# Patient Record
Sex: MALE | Race: WHITE | NOT HISPANIC OR LATINO | Employment: FULL TIME | URBAN - NONMETROPOLITAN AREA
[De-identification: names, ages, dates, MRNs, and addresses within clinical notes are randomized per-mention and may not be internally consistent; named-entity substitution may affect disease eponyms.]

---

## 2023-02-24 PROBLEM — F40.10 SOCIAL ANXIETY DISORDER: Status: ACTIVE | Noted: 2023-02-24

## 2023-02-24 PROBLEM — D69.6 THROMBOCYTOPENIA (CMS-HCC): Status: ACTIVE | Noted: 2023-02-24

## 2023-02-24 PROBLEM — R03.0 ELEVATED BLOOD PRESSURE READING WITHOUT DIAGNOSIS OF HYPERTENSION: Status: ACTIVE | Noted: 2023-02-24

## 2023-02-24 PROBLEM — M25.512 LEFT SHOULDER PAIN: Status: ACTIVE | Noted: 2023-02-24

## 2023-02-24 PROBLEM — F32.A DEPRESSION: Status: ACTIVE | Noted: 2023-02-24

## 2023-02-24 PROBLEM — F41.9 ANXIETY: Status: ACTIVE | Noted: 2023-02-24

## 2023-02-24 PROBLEM — F98.8 ADD (ATTENTION DEFICIT DISORDER): Status: ACTIVE | Noted: 2023-02-24

## 2023-02-24 PROBLEM — F41.0 PANIC ATTACK: Status: ACTIVE | Noted: 2023-02-24

## 2023-02-24 PROBLEM — Z86.19 HISTORY OF HERPES ZOSTER: Status: ACTIVE | Noted: 2023-02-24

## 2023-02-24 RX ORDER — CITALOPRAM 20 MG/1
20 TABLET, FILM COATED ORAL DAILY
COMMUNITY
End: 2023-08-28 | Stop reason: SDUPTHER

## 2023-02-24 RX ORDER — PROPRANOLOL HYDROCHLORIDE 40 MG/1
1 TABLET ORAL 2 TIMES DAILY PRN
COMMUNITY
Start: 2015-04-28 | End: 2023-08-30 | Stop reason: WASHOUT

## 2023-02-24 RX ORDER — HYDROXYZINE HYDROCHLORIDE 25 MG/1
1-2 TABLET, FILM COATED ORAL EVERY 6 HOURS PRN
COMMUNITY
End: 2023-08-29 | Stop reason: SDUPTHER

## 2023-02-24 RX ORDER — BUPROPION HYDROCHLORIDE 150 MG/1
150 TABLET ORAL EVERY MORNING
COMMUNITY
End: 2023-08-30 | Stop reason: WASHOUT

## 2023-03-23 ENCOUNTER — APPOINTMENT (OUTPATIENT)
Dept: PRIMARY CARE | Facility: CLINIC | Age: 33
End: 2023-03-23

## 2023-07-18 ENCOUNTER — TELEPHONE (OUTPATIENT)
Dept: PRIMARY CARE | Facility: CLINIC | Age: 33
End: 2023-07-18

## 2023-07-18 NOTE — TELEPHONE ENCOUNTER
----- Message from Sarah Beth Tirado DO sent at 7/12/2023 12:15 PM EDT -----  Regarding: FW: Testing inquiry  Contact: [phone]  SVN to Look up infertility    ----- Message -----  From: Franciose Bai CMA  Sent: 7/12/2023  10:08 AM EDT  To: Sarah Beth Tirado DO  Subject: FW: Testing inquiry                                ----- Message -----  From: Corwin Sesay  Sent: 7/12/2023  10:03 AM EDT  To: [facility] Support Staff  Subject: Testing inquiry                                  Hello! I have a question I hope you could give me some advice on. My wife and I have been trying to conceive for 6 months. With our first daughter, we had no problem and conceived without an issue. I was wondering how I would go about getting my sperm tested to see if there’s anything I can do to improve our chances.

## 2023-08-28 DIAGNOSIS — F32.A DEPRESSION, UNSPECIFIED DEPRESSION TYPE: ICD-10-CM

## 2023-08-28 DIAGNOSIS — F41.9 ANXIETY: ICD-10-CM

## 2023-08-29 RX ORDER — CITALOPRAM 20 MG/1
20 TABLET, FILM COATED ORAL DAILY
Qty: 90 TABLET | Refills: 3 | Status: SHIPPED | OUTPATIENT
Start: 2023-08-29 | End: 2024-08-28

## 2023-08-29 NOTE — PROGRESS NOTES
"Subjective   Patient ID: Corwin Sesay is a 32 y.o. male who presents for Follow-up (Pt presents for follow up medications- pt states no concerns at this time.).    HPI     Patient is self-pay.    Patient presents today for follow-up mood and ADD.    CHRONIC CONDITIONS:  -Mood  Notes felt depressed after tapering off the Adderall he was on for ADD  Since resumed mood medications and trying non-stimulants per 2/2023 OV.    Current regimen:  Celexa 20 mg daily, per prior note would consider Pristiq if not effective/tolerable    Prior medications:  Propranolol 40 mg PRN, just stopped.  BuSpar, caused insomnia   Lexapro, unable to tolerate due to decreased libido.  Wellbutrin, prescribed 2/2023, not taking per 8/2023 visit.    States he continues with Celexa at this time.  Feels his mood is doing well overall.  Does think there is some sedation, has a hard time waking up.  Taking meds at 5 pm, was taking in the AM but felt tired all day after.  No other side effects reported.  States he thinks he needs to continue on medication, will sometimes get \"alyssa vu\" type episodes of flash backs to when he felt depressed.     -ADD  Wellbutrin 150 mg daily, started 2/2023, no longer taking 8/2023  Was on Adderall prior to this, did not want to be on this anymore so stopped it and had changes in mood.      Review of Systems   All other systems reviewed and are negative.      Objective   /66 (BP Location: Left arm, Patient Position: Sitting, BP Cuff Size: Small adult)   Pulse 79   Temp 36.8 °C (98.2 °F) (Temporal)   Resp 16   Wt 85.1 kg (187 lb 9.6 oz)   SpO2 98%   BMI 24.75 kg/m²     Physical Exam  Vitals and nursing note reviewed.   Constitutional:       General: He is not in acute distress.     Appearance: Normal appearance. He is not toxic-appearing.   HENT:      Head: Normocephalic and atraumatic.   Eyes:      Extraocular Movements: Extraocular movements intact.      Pupils: Pupils are equal, round, and reactive to " "light.   Cardiovascular:      Rate and Rhythm: Normal rate and regular rhythm.      Heart sounds: No murmur heard.     No friction rub. No gallop.   Pulmonary:      Effort: Pulmonary effort is normal.      Breath sounds: Normal breath sounds. No wheezing, rhonchi or rales.   Neurological:      General: No focal deficit present.      Mental Status: He is alert and oriented to person, place, and time.   Psychiatric:         Mood and Affect: Mood normal.         Behavior: Behavior normal.         Assessment/Plan   Problem List Items Addressed This Visit       ADD (attention deficit disorder)     Doing well off medications for ADD, plans to continue to manage on his own at this time.         Depression - Primary     Reports good control on current regimen, will continue with Celexa 20 mg daily.  Patient to reach out if reassessment needed.  Non-prescription measures reviewed with patient.  Patient advised to monitor his \"alyssa vu\" type sensations, he should reach out if these persist or become concerning.            Follow-up in 1 year for routine care.  Call for sooner follow-up if needed.     Scribe Attestation  By signing my name below, IBrandy, Rodriguez   attest that this documentation has been prepared under the direction and in the presence of Sarah Beth Tirado DO.      "

## 2023-08-30 ENCOUNTER — OFFICE VISIT (OUTPATIENT)
Dept: PRIMARY CARE | Facility: CLINIC | Age: 33
End: 2023-08-30

## 2023-08-30 VITALS
SYSTOLIC BLOOD PRESSURE: 116 MMHG | DIASTOLIC BLOOD PRESSURE: 66 MMHG | BODY MASS INDEX: 24.75 KG/M2 | OXYGEN SATURATION: 98 % | HEART RATE: 79 BPM | WEIGHT: 187.6 LBS | RESPIRATION RATE: 16 BRPM | TEMPERATURE: 98.2 F

## 2023-08-30 DIAGNOSIS — F32.A DEPRESSION, UNSPECIFIED DEPRESSION TYPE: Primary | ICD-10-CM

## 2023-08-30 DIAGNOSIS — F98.8 ATTENTION DEFICIT DISORDER, UNSPECIFIED HYPERACTIVITY PRESENCE: ICD-10-CM

## 2023-08-30 PROCEDURE — 99213 OFFICE O/P EST LOW 20 MIN: CPT | Performed by: FAMILY MEDICINE

## 2023-08-30 PROCEDURE — 1036F TOBACCO NON-USER: CPT | Performed by: FAMILY MEDICINE

## 2023-08-30 ASSESSMENT — PATIENT HEALTH QUESTIONNAIRE - PHQ9
1. LITTLE INTEREST OR PLEASURE IN DOING THINGS: NOT AT ALL
7. TROUBLE CONCENTRATING ON THINGS, SUCH AS READING THE NEWSPAPER OR WATCHING TELEVISION: NOT AT ALL
8. MOVING OR SPEAKING SO SLOWLY THAT OTHER PEOPLE COULD HAVE NOTICED. OR THE OPPOSITE, BEING SO FIGETY OR RESTLESS THAT YOU HAVE BEEN MOVING AROUND A LOT MORE THAN USUAL: NOT AT ALL
SUM OF ALL RESPONSES TO PHQ QUESTIONS 1-9: 4
2. FEELING DOWN, DEPRESSED OR HOPELESS: NOT AT ALL
SUM OF ALL RESPONSES TO PHQ9 QUESTIONS 1 AND 2: 0
4. FEELING TIRED OR HAVING LITTLE ENERGY: SEVERAL DAYS
6. FEELING BAD ABOUT YOURSELF - OR THAT YOU ARE A FAILURE OR HAVE LET YOURSELF OR YOUR FAMILY DOWN: NOT AT ALL
9. THOUGHTS THAT YOU WOULD BE BETTER OFF DEAD, OR OF HURTING YOURSELF: NOT AT ALL
10. IF YOU CHECKED OFF ANY PROBLEMS, HOW DIFFICULT HAVE THESE PROBLEMS MADE IT FOR YOU TO DO YOUR WORK, TAKE CARE OF THINGS AT HOME, OR GET ALONG WITH OTHER PEOPLE: SOMEWHAT DIFFICULT
5. POOR APPETITE OR OVEREATING: SEVERAL DAYS
3. TROUBLE FALLING OR STAYING ASLEEP OR SLEEPING TOO MUCH: MORE THAN HALF THE DAYS

## 2023-08-30 ASSESSMENT — ANXIETY QUESTIONNAIRES
1. FEELING NERVOUS, ANXIOUS, OR ON EDGE: SEVERAL DAYS
7. FEELING AFRAID AS IF SOMETHING AWFUL MIGHT HAPPEN: SEVERAL DAYS
3. WORRYING TOO MUCH ABOUT DIFFERENT THINGS: NOT AT ALL
IF YOU CHECKED OFF ANY PROBLEMS ON THIS QUESTIONNAIRE, HOW DIFFICULT HAVE THESE PROBLEMS MADE IT FOR YOU TO DO YOUR WORK, TAKE CARE OF THINGS AT HOME, OR GET ALONG WITH OTHER PEOPLE: SOMEWHAT DIFFICULT
6. BECOMING EASILY ANNOYED OR IRRITABLE: NOT AT ALL
5. BEING SO RESTLESS THAT IT IS HARD TO SIT STILL: NOT AT ALL
4. TROUBLE RELAXING: SEVERAL DAYS
2. NOT BEING ABLE TO STOP OR CONTROL WORRYING: SEVERAL DAYS
GAD7 TOTAL SCORE: 4

## 2023-08-30 NOTE — ASSESSMENT & PLAN NOTE
">>ASSESSMENT AND PLAN FOR DEPRESSION WRITTEN ON 8/30/2023 11:34 AM BY ASHELY DE LA FUENTE    Reports good control on current regimen, will continue with Celexa 20 mg daily.  Patient to reach out if reassessment needed.  Non-prescription measures reviewed with patient.  Patient advised to monitor his \"alyssa vu\" type sensations, he should reach out if these persist or become concerning.  "

## 2023-08-30 NOTE — ASSESSMENT & PLAN NOTE
"Reports good control on current regimen, will continue with Celexa 20 mg daily.  Patient to reach out if reassessment needed.  Non-prescription measures reviewed with patient.  Patient advised to monitor his \"alyssa vu\" type sensations, he should reach out if these persist or become concerning.  "

## 2023-10-15 DIAGNOSIS — Z31.69 INFERTILITY COUNSELING: Primary | ICD-10-CM

## 2023-11-27 ENCOUNTER — APPOINTMENT (OUTPATIENT)
Dept: UROLOGY | Facility: HOSPITAL | Age: 33
End: 2023-11-27

## 2024-05-24 ENCOUNTER — OFFICE VISIT (OUTPATIENT)
Dept: PRIMARY CARE | Facility: CLINIC | Age: 34
End: 2024-05-24

## 2024-05-24 VITALS
BODY MASS INDEX: 24 KG/M2 | WEIGHT: 181.9 LBS | TEMPERATURE: 98.1 F | DIASTOLIC BLOOD PRESSURE: 74 MMHG | SYSTOLIC BLOOD PRESSURE: 123 MMHG | OXYGEN SATURATION: 97 % | HEART RATE: 92 BPM

## 2024-05-24 DIAGNOSIS — F98.8 ATTENTION DEFICIT DISORDER, UNSPECIFIED HYPERACTIVITY PRESENCE: Primary | ICD-10-CM

## 2024-05-24 DIAGNOSIS — H60.502 ACUTE OTITIS EXTERNA OF LEFT EAR, UNSPECIFIED TYPE: ICD-10-CM

## 2024-05-24 PROCEDURE — 99213 OFFICE O/P EST LOW 20 MIN: CPT | Performed by: FAMILY MEDICINE

## 2024-05-24 RX ORDER — DEXTROAMPHETAMINE SACCHARATE, AMPHETAMINE ASPARTATE MONOHYDRATE, DEXTROAMPHETAMINE SULFATE AND AMPHETAMINE SULFATE 7.5; 7.5; 7.5; 7.5 MG/1; MG/1; MG/1; MG/1
30 CAPSULE, EXTENDED RELEASE ORAL EVERY MORNING
Qty: 30 CAPSULE | Refills: 0 | Status: SHIPPED | OUTPATIENT
Start: 2024-05-24 | End: 2024-06-23

## 2024-05-24 RX ORDER — NEOMYCIN SULFATE, POLYMYXIN B SULFATE AND HYDROCORTISONE 10; 3.5; 1 MG/ML; MG/ML; [USP'U]/ML
3 SUSPENSION/ DROPS AURICULAR (OTIC) 4 TIMES DAILY
Qty: 10 ML | Refills: 0 | Status: SHIPPED | OUTPATIENT
Start: 2024-05-24 | End: 2024-05-31

## 2024-05-24 NOTE — PROGRESS NOTES
Subjective   Patient ID: Corwin Sesay is a 33 y.o. male who presents for Med Management (Pt wants to be put back on ADHD medication ) and Earache (Symptoms: drainage, pain, fullness started having pain yesterday morning and has become worse ).  HPI  ADD  - Adderall  IR 20 mg TID ,  for cost reasons   Would like to be on extended release   for ease. 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  Working long hours .  Has been on meds of various time release and at one point was on XR in day,  IR afternoon.   Does not feel that is needed with current lifestyle/ work     Depression and Anxiety - several meds tried .  Celexa seems to be the one he tolerates the best . Plans to continue     Ear pain, with drng.    Sxs as in nurse note above.     Review of Systems    Objective   /74 (BP Location: Right arm, Patient Position: Sitting, BP Cuff Size: Adult)   Pulse 92   Temp 36.7 °C (98.1 °F) (Temporal)   Wt 82.5 kg (181 lb 14.4 oz)   SpO2 97%   BMI 24.00 kg/m²     Physical Exam  Vitals and nursing note reviewed.   Cardiovascular:      Heart sounds: Normal heart sounds.   Pulmonary:      Breath sounds: Normal breath sounds.   Neurological:      General: No focal deficit present.      Mental Status: He is oriented to person, place, and time. Mental status is at baseline.   Psychiatric:         Mood and Affect: Mood normal.         Behavior: Behavior normal.         Thought Content: Thought content normal.         Judgment: Judgment normal.           EAR EXAM :  swelling of canal,  w mild erythema, and white discharge    Assessment/Plan   Problem List Items Addressed This Visit          Medium    ADD (attention deficit disorder) - Primary    Relevant Medications    amphetamine-dextroamphetamine XR (Adderall XR) 30 mg 24 hr capsule    Other Relevant Orders    DRUG SCREEN,URINE     Other Visit Diagnoses       Acute otitis externa of left ear, unspecified type         Relevant Medications    neomycin-polymyxin-HC (Cortisporin) 3.5-10,000-1 mg/mL-unit/mL-% otic suspension          ADD   - restart medication   - CSA completed today ,  UDS ordered today   - reviewed sxs,  outcome desired , will monitor while on todays prescribed dose and let us know if not achieving outcome or having side eff.    Acute OE   -tx prescribed,   Notify/ followup if not resolving    NEXT Visit  : CARINA Quiroz MD

## 2024-06-02 ENCOUNTER — PATIENT MESSAGE (OUTPATIENT)
Dept: PRIMARY CARE | Facility: CLINIC | Age: 34
End: 2024-06-02

## 2024-06-11 ENCOUNTER — LAB (OUTPATIENT)
Dept: LAB | Facility: LAB | Age: 34
End: 2024-06-11

## 2024-06-11 DIAGNOSIS — F98.8 ATTENTION DEFICIT DISORDER, UNSPECIFIED HYPERACTIVITY PRESENCE: ICD-10-CM

## 2024-06-11 PROCEDURE — 80307 DRUG TEST PRSMV CHEM ANLYZR: CPT

## 2024-06-12 LAB
AMPHETAMINES UR QL SCN: ABNORMAL
BARBITURATES UR QL SCN: ABNORMAL
BENZODIAZ UR QL SCN: ABNORMAL
BZE UR QL SCN: ABNORMAL
CANNABINOIDS UR QL SCN: ABNORMAL
FENTANYL+NORFENTANYL UR QL SCN: ABNORMAL
METHADONE UR QL SCN: ABNORMAL
OPIATES UR QL SCN: ABNORMAL
OXYCODONE+OXYMORPHONE UR QL SCN: ABNORMAL
PCP UR QL SCN: ABNORMAL

## 2024-06-21 DIAGNOSIS — F98.8 ATTENTION DEFICIT DISORDER, UNSPECIFIED HYPERACTIVITY PRESENCE: ICD-10-CM

## 2024-06-21 RX ORDER — DEXTROAMPHETAMINE SACCHARATE, AMPHETAMINE ASPARTATE MONOHYDRATE, DEXTROAMPHETAMINE SULFATE AND AMPHETAMINE SULFATE 7.5; 7.5; 7.5; 7.5 MG/1; MG/1; MG/1; MG/1
30 CAPSULE, EXTENDED RELEASE ORAL EVERY MORNING
Qty: 30 CAPSULE | Refills: 0 | Status: SHIPPED | OUTPATIENT
Start: 2024-06-21 | End: 2024-07-21

## 2024-06-24 DIAGNOSIS — F98.8 ATTENTION DEFICIT DISORDER, UNSPECIFIED HYPERACTIVITY PRESENCE: ICD-10-CM

## 2024-06-24 RX ORDER — DEXTROAMPHETAMINE SACCHARATE, AMPHETAMINE ASPARTATE MONOHYDRATE, DEXTROAMPHETAMINE SULFATE AND AMPHETAMINE SULFATE 7.5; 7.5; 7.5; 7.5 MG/1; MG/1; MG/1; MG/1
30 CAPSULE, EXTENDED RELEASE ORAL EVERY MORNING
Qty: 30 CAPSULE | Refills: 0 | Status: SHIPPED | OUTPATIENT
Start: 2024-06-24 | End: 2024-07-24

## 2024-07-22 DIAGNOSIS — F98.8 ATTENTION DEFICIT DISORDER, UNSPECIFIED HYPERACTIVITY PRESENCE: ICD-10-CM

## 2024-07-22 RX ORDER — DEXTROAMPHETAMINE SACCHARATE, AMPHETAMINE ASPARTATE MONOHYDRATE, DEXTROAMPHETAMINE SULFATE AND AMPHETAMINE SULFATE 7.5; 7.5; 7.5; 7.5 MG/1; MG/1; MG/1; MG/1
30 CAPSULE, EXTENDED RELEASE ORAL EVERY MORNING
Qty: 30 CAPSULE | Refills: 0 | Status: SHIPPED | OUTPATIENT
Start: 2024-07-22 | End: 2024-08-21

## 2024-08-16 DIAGNOSIS — F32.A DEPRESSION, UNSPECIFIED DEPRESSION TYPE: ICD-10-CM

## 2024-08-16 DIAGNOSIS — F41.9 ANXIETY: ICD-10-CM

## 2024-08-16 RX ORDER — CITALOPRAM 20 MG/1
20 TABLET, FILM COATED ORAL DAILY
Qty: 90 TABLET | Refills: 3 | Status: SHIPPED | OUTPATIENT
Start: 2024-08-16 | End: 2025-08-16

## 2024-08-21 DIAGNOSIS — F98.8 ATTENTION DEFICIT DISORDER, UNSPECIFIED HYPERACTIVITY PRESENCE: Primary | ICD-10-CM

## 2024-08-21 RX ORDER — DEXTROAMPHETAMINE SACCHARATE, AMPHETAMINE ASPARTATE MONOHYDRATE, DEXTROAMPHETAMINE SULFATE AND AMPHETAMINE SULFATE 7.5; 7.5; 7.5; 7.5 MG/1; MG/1; MG/1; MG/1
30 CAPSULE, EXTENDED RELEASE ORAL EVERY MORNING
Qty: 30 CAPSULE | Refills: 0 | Status: SHIPPED | OUTPATIENT
Start: 2024-09-20 | End: 2024-10-20

## 2024-08-21 RX ORDER — DEXTROAMPHETAMINE SACCHARATE, AMPHETAMINE ASPARTATE MONOHYDRATE, DEXTROAMPHETAMINE SULFATE AND AMPHETAMINE SULFATE 7.5; 7.5; 7.5; 7.5 MG/1; MG/1; MG/1; MG/1
30 CAPSULE, EXTENDED RELEASE ORAL EVERY MORNING
Qty: 30 CAPSULE | Refills: 0 | Status: SHIPPED | OUTPATIENT
Start: 2024-08-21 | End: 2024-09-20

## 2024-08-21 RX ORDER — DEXTROAMPHETAMINE SACCHARATE, AMPHETAMINE ASPARTATE, DEXTROAMPHETAMINE SULFATE AND AMPHETAMINE SULFATE 5; 5; 5; 5 MG/1; MG/1; MG/1; MG/1
20 TABLET ORAL DAILY
Qty: 30 TABLET | Refills: 0 | Status: SHIPPED | OUTPATIENT
Start: 2024-09-20 | End: 2024-10-20

## 2024-08-21 RX ORDER — DEXTROAMPHETAMINE SACCHARATE, AMPHETAMINE ASPARTATE, DEXTROAMPHETAMINE SULFATE AND AMPHETAMINE SULFATE 5; 5; 5; 5 MG/1; MG/1; MG/1; MG/1
20 TABLET ORAL DAILY
Qty: 30 TABLET | Refills: 0 | Status: SHIPPED | OUTPATIENT
Start: 2024-10-20 | End: 2024-11-19

## 2024-08-21 RX ORDER — DEXTROAMPHETAMINE SACCHARATE, AMPHETAMINE ASPARTATE, DEXTROAMPHETAMINE SULFATE AND AMPHETAMINE SULFATE 5; 5; 5; 5 MG/1; MG/1; MG/1; MG/1
20 TABLET ORAL DAILY
Qty: 30 TABLET | Refills: 0 | Status: SHIPPED | OUTPATIENT
Start: 2024-08-21 | End: 2024-09-20

## 2024-08-21 RX ORDER — DEXTROAMPHETAMINE SACCHARATE, AMPHETAMINE ASPARTATE MONOHYDRATE, DEXTROAMPHETAMINE SULFATE AND AMPHETAMINE SULFATE 7.5; 7.5; 7.5; 7.5 MG/1; MG/1; MG/1; MG/1
30 CAPSULE, EXTENDED RELEASE ORAL EVERY MORNING
Qty: 30 CAPSULE | Refills: 0 | Status: SHIPPED | OUTPATIENT
Start: 2024-10-20 | End: 2024-11-19

## 2024-09-03 ENCOUNTER — APPOINTMENT (OUTPATIENT)
Dept: PRIMARY CARE | Facility: CLINIC | Age: 34
End: 2024-09-03

## 2024-09-03 VITALS
HEART RATE: 91 BPM | SYSTOLIC BLOOD PRESSURE: 131 MMHG | OXYGEN SATURATION: 98 % | BODY MASS INDEX: 22.87 KG/M2 | HEIGHT: 74 IN | DIASTOLIC BLOOD PRESSURE: 80 MMHG | WEIGHT: 178.2 LBS | RESPIRATION RATE: 14 BRPM | TEMPERATURE: 96.8 F

## 2024-09-03 DIAGNOSIS — R03.0 ELEVATED BLOOD PRESSURE READING WITHOUT DIAGNOSIS OF HYPERTENSION: ICD-10-CM

## 2024-09-03 DIAGNOSIS — F98.8 ATTENTION DEFICIT DISORDER, UNSPECIFIED HYPERACTIVITY PRESENCE: ICD-10-CM

## 2024-09-03 DIAGNOSIS — F32.A DEPRESSION, UNSPECIFIED DEPRESSION TYPE: Primary | ICD-10-CM

## 2024-09-03 PROBLEM — M25.512 LEFT SHOULDER PAIN: Status: RESOLVED | Noted: 2023-02-24 | Resolved: 2024-09-03

## 2024-09-03 PROBLEM — Z00.00 HEALTHCARE MAINTENANCE: Status: ACTIVE | Noted: 2024-09-03

## 2024-09-03 PROBLEM — D69.6 THROMBOCYTOPENIA (CMS-HCC): Status: RESOLVED | Noted: 2023-02-24 | Resolved: 2024-09-03

## 2024-09-03 PROCEDURE — 1036F TOBACCO NON-USER: CPT | Performed by: FAMILY MEDICINE

## 2024-09-03 PROCEDURE — 99213 OFFICE O/P EST LOW 20 MIN: CPT | Performed by: FAMILY MEDICINE

## 2024-09-03 PROCEDURE — 3008F BODY MASS INDEX DOCD: CPT | Performed by: FAMILY MEDICINE

## 2024-09-03 RX ORDER — DEXTROAMPHETAMINE SACCHARATE, AMPHETAMINE ASPARTATE MONOHYDRATE, DEXTROAMPHETAMINE SULFATE AND AMPHETAMINE SULFATE 7.5; 7.5; 7.5; 7.5 MG/1; MG/1; MG/1; MG/1
30 CAPSULE, EXTENDED RELEASE ORAL EVERY MORNING
Qty: 90 CAPSULE | Refills: 0 | Status: SHIPPED | OUTPATIENT
Start: 2024-09-20 | End: 2024-12-19

## 2024-09-03 RX ORDER — DEXTROAMPHETAMINE SACCHARATE, AMPHETAMINE ASPARTATE, DEXTROAMPHETAMINE SULFATE AND AMPHETAMINE SULFATE 5; 5; 5; 5 MG/1; MG/1; MG/1; MG/1
20 TABLET ORAL DAILY
Qty: 90 TABLET | Refills: 0 | Status: SHIPPED | OUTPATIENT
Start: 2024-09-20 | End: 2024-12-19

## 2024-09-03 ASSESSMENT — PATIENT HEALTH QUESTIONNAIRE - PHQ9
3. TROUBLE FALLING OR STAYING ASLEEP OR SLEEPING TOO MUCH: SEVERAL DAYS
8. MOVING OR SPEAKING SO SLOWLY THAT OTHER PEOPLE COULD HAVE NOTICED. OR THE OPPOSITE, BEING SO FIGETY OR RESTLESS THAT YOU HAVE BEEN MOVING AROUND A LOT MORE THAN USUAL: NOT AT ALL
SUM OF ALL RESPONSES TO PHQ9 QUESTIONS 1 AND 2: 0
10. IF YOU CHECKED OFF ANY PROBLEMS, HOW DIFFICULT HAVE THESE PROBLEMS MADE IT FOR YOU TO DO YOUR WORK, TAKE CARE OF THINGS AT HOME, OR GET ALONG WITH OTHER PEOPLE: SOMEWHAT DIFFICULT
7. TROUBLE CONCENTRATING ON THINGS, SUCH AS READING THE NEWSPAPER OR WATCHING TELEVISION: NOT AT ALL
9. THOUGHTS THAT YOU WOULD BE BETTER OFF DEAD, OR OF HURTING YOURSELF: NOT AT ALL
1. LITTLE INTEREST OR PLEASURE IN DOING THINGS: NOT AT ALL
4. FEELING TIRED OR HAVING LITTLE ENERGY: NOT AT ALL
2. FEELING DOWN, DEPRESSED OR HOPELESS: NOT AT ALL
SUM OF ALL RESPONSES TO PHQ QUESTIONS 1-9: 1
5. POOR APPETITE OR OVEREATING: NOT AT ALL
6. FEELING BAD ABOUT YOURSELF - OR THAT YOU ARE A FAILURE OR HAVE LET YOURSELF OR YOUR FAMILY DOWN: NOT AT ALL

## 2024-09-03 ASSESSMENT — ANXIETY QUESTIONNAIRES
3. WORRYING TOO MUCH ABOUT DIFFERENT THINGS: SEVERAL DAYS
GAD7 TOTAL SCORE: 2
5. BEING SO RESTLESS THAT IT IS HARD TO SIT STILL: NOT AT ALL
2. NOT BEING ABLE TO STOP OR CONTROL WORRYING: SEVERAL DAYS
6. BECOMING EASILY ANNOYED OR IRRITABLE: NOT AT ALL
IF YOU CHECKED OFF ANY PROBLEMS ON THIS QUESTIONNAIRE, HOW DIFFICULT HAVE THESE PROBLEMS MADE IT FOR YOU TO DO YOUR WORK, TAKE CARE OF THINGS AT HOME, OR GET ALONG WITH OTHER PEOPLE: NOT DIFFICULT AT ALL
1. FEELING NERVOUS, ANXIOUS, OR ON EDGE: NOT AT ALL
4. TROUBLE RELAXING: NOT AT ALL
7. FEELING AFRAID AS IF SOMETHING AWFUL MIGHT HAPPEN: NOT AT ALL

## 2024-09-03 NOTE — ASSESSMENT & PLAN NOTE
Well controlled on current regimen.    Continue Adderall XR 30 mg daily and Adderall IR 20 mg PRN.    Prescriptions have been refilled. If symptoms are well controlled, and any side effects of your medicine are tolerable, call in 3 months for another 3 months of refills. We will do a routine check up for ADD in the office in 6 months.       As always, consistent exercise and a healthy diet with limited refined carbohydrates can be helpful for maximizing focus.  This means limiting or eliminating such things as pretzels, pasta, cereals, breakfast bars, breads, pastries, and sugary drinks and  instead eating generous amounts of vegetables, lean proteins, and healthy fats such as those found in olive oil, avocados, fatty fishes such as salmon, and nuts .      Consistent exercise can also help with focus.   150 minutes of exercise per week divided up on multiple days is recommended.     Controlled Substance Visit -- I have personally reviewed the OARRS report for this patient. There is copy of report in electronic medical record. I have considered the risks of abuse, dependence, addiction, and diversion. I believe that it is clinically appropriate for this patient to be prescribed this medication.    CS requirements:  CSA completed 5/24/2024  UDS completed 6/11/2024

## 2024-09-03 NOTE — ASSESSMENT & PLAN NOTE
">>ASSESSMENT AND PLAN FOR DEPRESSION WRITTEN ON 9/3/2024 10:35 AM BY ASHLEY DE LA FUENTE    Scales and scores reviewed and discussed, these indicate clinical remission.  Since under good control on current regimen, will continue with Celexa 20 mg daily.  We discussed option of \"Genesight Testing\" for preferred medications, patient to reach out if wishes to pursue.  Will continue to follow-up on mood every 6 months, can call if sooner evaluation needed.  "

## 2024-09-03 NOTE — PROGRESS NOTES
Subjective   Patient ID: Corwin Sesay is a 33 y.o. male who presents for Flu Vaccine (Pt declines flu vaccine at this time. /) and Follow-up.    HPI     Patient presents today for routine follow-up + CS visit  Initially scheduled for annual physical - this was deferred as patient is self pay  No family hx of colon cancer or prostate cancer  No family hx of cardiac disease    Patient concerns:  Patient is doing well overall, they have no new concerns or issues.     ROUTINE VISIT  CHRONIC CONDITIONS:   Mood  Notes felt depressed after tapering off the Adderall he was on for ADD  Since resumed mood medications and trying non-stimulants per 2/2023 OV.  Resuming stimulants for ADD 5/2024    Current regimen:  Celexa 20 mg daily, per prior note would consider Pristiq if not effective/tolerable    Prior medications:  Propranolol 40 mg PRN, just stopped.  BuSpar, caused insomnia   Lexapro, unable to tolerate due to decreased libido.  Wellbutrin, prescribed 2/2023, not taking per 8/2023 visit.    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   Patient reports increased focus on medication. There is improved concentration, organization, and task completion.   Side effects are denied. No headaches, anorexia, weight loss, palpitations, or stomach upset.  Patient feels that the current dose is appropriate, and wishes to continue on the currently prescribed medication as reflected in chart.      No illcit drug use  No EtOH use, states has not had any in 2 years    States adderall not only helps with ADD symptoms but overall mood.    Review of Systems   All other systems reviewed and are negative.      Objective   /80 (BP Location: Left arm, Patient Position: Sitting, BP Cuff Size: Adult)   Pulse 91   Temp 36 °C (96.8 °F) (Temporal)   Resp 14   Ht 1.88 m (6' 2\")   Wt 80.8 kg (178 lb 3.2 oz)   SpO2 98%   BMI 22.88 kg/m²     Physical Exam  Vitals and nursing note reviewed.   Constitutional:       General: He is not in acute distress.     Appearance: Normal appearance. He is not toxic-appearing.   HENT:      Head: Normocephalic and atraumatic.   Eyes:      Extraocular Movements: Extraocular movements intact.      Pupils: Pupils are equal, round, and reactive to light.   Cardiovascular:      Rate and Rhythm: Normal rate and regular rhythm.      Heart sounds: No murmur heard.     No friction rub. No gallop.   Pulmonary:      Effort: Pulmonary effort is normal.      Breath sounds: Normal breath sounds. No wheezing, rhonchi or rales.   Neurological:      General: No focal deficit present.      Mental Status: He is alert and oriented to person, place, and time.   Psychiatric:         Mood and Affect: Mood normal.         Behavior: Behavior normal.         Assessment/Plan   Problem List Items Addressed This Visit             ICD-10-CM    ADD (attention deficit disorder) F98.8     Well controlled on current regimen.    Continue Adderall XR 30 mg daily and Adderall IR 20 mg PRN.    Prescriptions have been refilled. If symptoms are well " "controlled, and any side effects of your medicine are tolerable, call in 3 months for another 3 months of refills. We will do a routine check up for ADD in the office in 6 months.       As always, consistent exercise and a healthy diet with limited refined carbohydrates can be helpful for maximizing focus.  This means limiting or eliminating such things as pretzels, pasta, cereals, breakfast bars, breads, pastries, and sugary drinks and  instead eating generous amounts of vegetables, lean proteins, and healthy fats such as those found in olive oil, avocados, fatty fishes such as salmon, and nuts .      Consistent exercise can also help with focus.   150 minutes of exercise per week divided up on multiple days is recommended.     Controlled Substance Visit -- I have personally reviewed the OARRS report for this patient. There is copy of report in electronic medical record. I have considered the risks of abuse, dependence, addiction, and diversion. I believe that it is clinically appropriate for this patient to be prescribed this medication.    CS requirements:  CSA completed 5/24/2024  UDS completed 6/11/2024         Depression - Primary F32.A     Scales and scores reviewed and discussed, these indicate clinical remission.  Since under good control on current regimen, will continue with Celexa 20 mg daily.  We discussed option of \"Genesight Testing\" for preferred medications, patient to reach out if wishes to pursue.  Will continue to follow-up on mood every 6 months, can call if sooner evaluation needed.         Elevated blood pressure reading without diagnosis of hypertension R03.0     BP is 131/80 in office today, Goal BP is 130/80 or less, ideally 120/80 or less.   Patient to monitor BP outside the office, if consistently elevated will need to revisit.    Patient is encouraged to continue low sodium diet (Dietary Approach to Stop Hypertension, or DASH diet) .   Exercise most days of the week.   Limit refined " carbohydrates such as pretzels, cereals, breads, pastries, alcohol.    If patient wishes to try a natural approach to lowering blood pressure, can consider:  -whey protein 20 -30 g daily (hydrolyzed is best, but any is ok)  -aged garlic 600 mg twice daily (Kyolic brand aged garlic on line is one example)   -CoQ10 supplement at 120 mg - 360 mg daily (average dose studied 225mg daily).     Patient to check BP regularly at home and keep a diary.  This should be taken after sitting with feet flat on floor and resting for 5 minutes.   Arm should be level with your heart.   New guidelines recommend goal for blood pressure less than 130/80.   Ideally for stroke and heart attack risk reduction the systolic, or top, blood pressure number should be in the 110's or 120's.    PLEASE BRING BP CUFF IN TO NEXT VISIT FOR VALIDATION - TAKE YOUR BP @ HOME BEFORE YOU COME!             Follow-up in 6 months for recheck ADD + CS visit.  Call for sooner follow-up if needed.       Scribe Attestation  By signing my name below, IBrandy Scribe   attest that this documentation has been prepared under the direction and in the presence of Sarah Beth Tirado DO.

## 2024-09-03 NOTE — PATIENT INSTRUCTIONS
ADD - will send in 90 d worth when next due (sept 20)   adderall xr 30 am   adderall ir 20      Anxiety -  tried multiple agents in past,   had severe depression / anhedonia -   continue on citalopram/ celexa despite some sedation     Would consider GeneSite testing in future if we need to change medicine  (looked at infertility from sperm DNA fracturing-   1 meta analysis did show increased incidence with ssri's,     patient unsure if citalopram was related to his finding with infertility or not)     Patient will look up gene site,   can request testing from me (or dr sweeney or saqib in our practice both order that test) if interested in future     Pt is cash pay,  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

## 2024-09-03 NOTE — ASSESSMENT & PLAN NOTE
BP is 131/80 in office today, Goal BP is 130/80 or less, ideally 120/80 or less.   Patient to monitor BP outside the office, if consistently elevated will need to revisit.    Patient is encouraged to continue low sodium diet (Dietary Approach to Stop Hypertension, or DASH diet) .   Exercise most days of the week.   Limit refined carbohydrates such as pretzels, cereals, breads, pastries, alcohol.    If patient wishes to try a natural approach to lowering blood pressure, can consider:  -whey protein 20 -30 g daily (hydrolyzed is best, but any is ok)  -aged garlic 600 mg twice daily (Kyolic brand aged garlic on line is one example)   -CoQ10 supplement at 120 mg - 360 mg daily (average dose studied 225mg daily).     Patient to check BP regularly at home and keep a diary.  This should be taken after sitting with feet flat on floor and resting for 5 minutes.   Arm should be level with your heart.   New guidelines recommend goal for blood pressure less than 130/80.   Ideally for stroke and heart attack risk reduction the systolic, or top, blood pressure number should be in the 110's or 120's.    PLEASE BRING BP CUFF IN TO NEXT VISIT FOR VALIDATION - TAKE YOUR BP @ HOME BEFORE YOU COME!

## 2024-09-03 NOTE — ASSESSMENT & PLAN NOTE
"Scales and scores reviewed and discussed, these indicate clinical remission.  Since under good control on current regimen, will continue with Celexa 20 mg daily.  We discussed option of \"Genesight Testing\" for preferred medications, patient to reach out if wishes to pursue.  Will continue to follow-up on mood every 6 months, can call if sooner evaluation needed.  "

## 2024-10-10 DIAGNOSIS — F98.8 ATTENTION DEFICIT DISORDER, UNSPECIFIED TYPE: ICD-10-CM

## 2024-10-11 RX ORDER — DEXTROAMPHETAMINE SACCHARATE, AMPHETAMINE ASPARTATE MONOHYDRATE, DEXTROAMPHETAMINE SULFATE AND AMPHETAMINE SULFATE 7.5; 7.5; 7.5; 7.5 MG/1; MG/1; MG/1; MG/1
30 CAPSULE, EXTENDED RELEASE ORAL EVERY MORNING
Qty: 30 CAPSULE | Refills: 0 | Status: SHIPPED | OUTPATIENT
Start: 2024-10-11 | End: 2024-11-10

## 2024-10-11 RX ORDER — DEXTROAMPHETAMINE SACCHARATE, AMPHETAMINE ASPARTATE MONOHYDRATE, DEXTROAMPHETAMINE SULFATE AND AMPHETAMINE SULFATE 7.5; 7.5; 7.5; 7.5 MG/1; MG/1; MG/1; MG/1
30 CAPSULE, EXTENDED RELEASE ORAL EVERY MORNING
Qty: 30 CAPSULE | Refills: 0 | Status: SHIPPED | OUTPATIENT
Start: 2024-11-09 | End: 2024-12-09

## 2024-10-11 RX ORDER — DEXTROAMPHETAMINE SACCHARATE, AMPHETAMINE ASPARTATE MONOHYDRATE, DEXTROAMPHETAMINE SULFATE AND AMPHETAMINE SULFATE 7.5; 7.5; 7.5; 7.5 MG/1; MG/1; MG/1; MG/1
30 CAPSULE, EXTENDED RELEASE ORAL EVERY MORNING
Qty: 30 CAPSULE | Refills: 0 | Status: SHIPPED | OUTPATIENT
Start: 2024-12-09 | End: 2025-01-08

## 2024-11-08 DIAGNOSIS — F90.0 ATTENTION DEFICIT HYPERACTIVITY DISORDER (ADHD), PREDOMINANTLY INATTENTIVE TYPE: Primary | ICD-10-CM

## 2024-11-08 RX ORDER — DEXTROAMPHETAMINE SACCHARATE, AMPHETAMINE ASPARTATE, DEXTROAMPHETAMINE SULFATE AND AMPHETAMINE SULFATE 5; 5; 5; 5 MG/1; MG/1; MG/1; MG/1
20 TABLET ORAL DAILY
Qty: 90 TABLET | Refills: 0 | Status: SHIPPED | OUTPATIENT
Start: 2024-11-08 | End: 2025-02-06

## 2024-11-08 RX ORDER — DEXTROAMPHETAMINE SACCHARATE, AMPHETAMINE ASPARTATE MONOHYDRATE, DEXTROAMPHETAMINE SULFATE AND AMPHETAMINE SULFATE 7.5; 7.5; 7.5; 7.5 MG/1; MG/1; MG/1; MG/1
30 CAPSULE, EXTENDED RELEASE ORAL EVERY MORNING
Qty: 90 CAPSULE | Refills: 0 | Status: SHIPPED | OUTPATIENT
Start: 2024-11-08 | End: 2025-02-06

## 2025-01-30 ENCOUNTER — PATIENT MESSAGE (OUTPATIENT)
Dept: PRIMARY CARE | Facility: CLINIC | Age: 35
End: 2025-01-30

## 2025-01-30 DIAGNOSIS — F90.0 ATTENTION DEFICIT HYPERACTIVITY DISORDER (ADHD), PREDOMINANTLY INATTENTIVE TYPE: ICD-10-CM

## 2025-02-01 RX ORDER — DEXTROAMPHETAMINE SACCHARATE, AMPHETAMINE ASPARTATE MONOHYDRATE, DEXTROAMPHETAMINE SULFATE AND AMPHETAMINE SULFATE 7.5; 7.5; 7.5; 7.5 MG/1; MG/1; MG/1; MG/1
30 CAPSULE, EXTENDED RELEASE ORAL EVERY MORNING
Qty: 30 CAPSULE | Refills: 0 | Status: SHIPPED | OUTPATIENT
Start: 2025-02-05 | End: 2025-03-07

## 2025-02-01 RX ORDER — DEXTROAMPHETAMINE SACCHARATE, AMPHETAMINE ASPARTATE, DEXTROAMPHETAMINE SULFATE AND AMPHETAMINE SULFATE 5; 5; 5; 5 MG/1; MG/1; MG/1; MG/1
20 TABLET ORAL DAILY
Qty: 30 TABLET | Refills: 0 | Status: SHIPPED | OUTPATIENT
Start: 2025-02-05 | End: 2025-02-01

## 2025-02-01 RX ORDER — DEXTROAMPHETAMINE SACCHARATE, AMPHETAMINE ASPARTATE MONOHYDRATE, DEXTROAMPHETAMINE SULFATE AND AMPHETAMINE SULFATE 7.5; 7.5; 7.5; 7.5 MG/1; MG/1; MG/1; MG/1
30 CAPSULE, EXTENDED RELEASE ORAL EVERY MORNING
Qty: 30 CAPSULE | Refills: 0 | Status: SHIPPED | OUTPATIENT
Start: 2025-02-05 | End: 2025-02-01

## 2025-02-01 RX ORDER — DEXTROAMPHETAMINE SACCHARATE, AMPHETAMINE ASPARTATE, DEXTROAMPHETAMINE SULFATE AND AMPHETAMINE SULFATE 5; 5; 5; 5 MG/1; MG/1; MG/1; MG/1
20 TABLET ORAL DAILY
Qty: 30 TABLET | Refills: 0 | Status: SHIPPED | OUTPATIENT
Start: 2025-02-05 | End: 2025-03-07

## 2025-02-01 NOTE — PATIENT COMMUNICATION
BRIEF NOTE    Subjective/Objective:  Patient is new to me, as Dr. Tirado left the practice in January.     MyChart refill request for Adderall XR 30 and IR 20. Send to [facility]. PDMP reviewed. Picked up a 90 day supply on 11/9/24.     Assessment/Plan:  1. Attention deficit hyperactivity disorder (ADHD), predominantly inattentive type  - Patient has SVN appt in March. Will send message to pool to get appt changed.   -  message sent back to patient to get follow-up appt scheduled.   - Originally sent to [facility] in error, as pharmacy wasn't updated in system. Called [facility] 2/1/2025 9:50 AM and cancelled original rxs. Re-sent meds to [facility].   - amphetamine-dextroamphetamine (Adderall) 20 mg tablet; Take 1 tablet (20 mg) by mouth once daily. Do not fill before February 5, 2025.  Dispense: 30 tablet; Refill: 0  - amphetamine-dextroamphetamine XR (Adderall XR) 30 mg 24 hr capsule; Take 1 capsule (30 mg) by mouth once daily in the morning. Do not crush or chew. Do not fill before February 5, 2025.  Dispense: 30 capsule; Refill: 0    No problem-specific Assessment & Plan notes found for this encounter.        Jamaica Augustin DO, MSEd  [facility]  Office: [phone]  2/1/2025 9:46 AM

## 2025-02-04 DIAGNOSIS — F90.0 ATTENTION DEFICIT HYPERACTIVITY DISORDER (ADHD), PREDOMINANTLY INATTENTIVE TYPE: ICD-10-CM

## 2025-02-04 RX ORDER — DEXTROAMPHETAMINE SACCHARATE, AMPHETAMINE ASPARTATE MONOHYDRATE, DEXTROAMPHETAMINE SULFATE AND AMPHETAMINE SULFATE 7.5; 7.5; 7.5; 7.5 MG/1; MG/1; MG/1; MG/1
30 CAPSULE, EXTENDED RELEASE ORAL EVERY MORNING
Qty: 30 CAPSULE | Refills: 0 | Status: CANCELLED | OUTPATIENT
Start: 2025-02-05 | End: 2025-03-07

## 2025-02-04 RX ORDER — DEXTROAMPHETAMINE SACCHARATE, AMPHETAMINE ASPARTATE, DEXTROAMPHETAMINE SULFATE AND AMPHETAMINE SULFATE 5; 5; 5; 5 MG/1; MG/1; MG/1; MG/1
20 TABLET ORAL DAILY
Qty: 30 TABLET | Refills: 0 | Status: CANCELLED | OUTPATIENT
Start: 2025-02-05 | End: 2025-03-07

## 2025-02-04 NOTE — TELEPHONE ENCOUNTER
This is a duplicate request. Patient had Adderall IR 20 and XR 30 sent on 2/1/25, to start 2/5/25, as that is when he would be due based on picking up 90 day supply in November.     Jamaica Augustin DO, Rodrigue  [facility]  Office: [phone]  2/4/2025 6:10 PM

## 2025-03-02 ENCOUNTER — PATIENT MESSAGE (OUTPATIENT)
Dept: PRIMARY CARE | Facility: CLINIC | Age: 35
End: 2025-03-02

## 2025-03-02 DIAGNOSIS — F90.0 ATTENTION DEFICIT HYPERACTIVITY DISORDER (ADHD), PREDOMINANTLY INATTENTIVE TYPE: Primary | ICD-10-CM

## 2025-03-04 DIAGNOSIS — F90.0 ATTENTION DEFICIT HYPERACTIVITY DISORDER (ADHD), PREDOMINANTLY INATTENTIVE TYPE: ICD-10-CM

## 2025-03-04 RX ORDER — DEXTROAMPHETAMINE SACCHARATE, AMPHETAMINE ASPARTATE, DEXTROAMPHETAMINE SULFATE AND AMPHETAMINE SULFATE 5; 5; 5; 5 MG/1; MG/1; MG/1; MG/1
20 TABLET ORAL DAILY
Qty: 30 TABLET | Refills: 0 | Status: SHIPPED | OUTPATIENT
Start: 2025-03-04 | End: 2025-04-03

## 2025-03-04 RX ORDER — DEXTROAMPHETAMINE SACCHARATE, AMPHETAMINE ASPARTATE MONOHYDRATE, DEXTROAMPHETAMINE SULFATE AND AMPHETAMINE SULFATE 7.5; 7.5; 7.5; 7.5 MG/1; MG/1; MG/1; MG/1
30 CAPSULE, EXTENDED RELEASE ORAL EVERY MORNING
Qty: 30 CAPSULE | Refills: 0 | Status: SHIPPED | OUTPATIENT
Start: 2025-03-04 | End: 2025-03-08

## 2025-03-05 ENCOUNTER — APPOINTMENT (OUTPATIENT)
Dept: PRIMARY CARE | Facility: CLINIC | Age: 35
End: 2025-03-05

## 2025-03-05 NOTE — TELEPHONE ENCOUNTER
BRIEF NOTE    Subjective/Objective:  MyChart refill request for Adderall XR 30 and IR 20. Patient has appt next week with me 3/11, will need UDS, CSA. PDMP reviewed.     Assessment/Plan:  1. Attention deficit hyperactivity disorder (ADHD), predominantly inattentive type  - amphetamine-dextroamphetamine (Adderall) 20 mg tablet; Take 1 tablet (20 mg) by mouth once daily.  Dispense: 30 tablet; Refill: 0  - amphetamine-dextroamphetamine XR (Adderall XR) 30 mg 24 hr capsule; Take 1 capsule (30 mg) by mouth once daily in the morning. Do not crush or chew.  Dispense: 30 capsule; Refill: 0      No problem-specific Assessment & Plan notes found for this encounter.        Jamaica Augustin DO, Rodrigue  [facility]  Office: [phone]  3/4/2025 9:27 PM

## 2025-03-08 RX ORDER — DEXTROAMPHETAMINE SACCHARATE, AMPHETAMINE ASPARTATE MONOHYDRATE, DEXTROAMPHETAMINE SULFATE AND AMPHETAMINE SULFATE 7.5; 7.5; 7.5; 7.5 MG/1; MG/1; MG/1; MG/1
30 CAPSULE, EXTENDED RELEASE ORAL EVERY MORNING
Qty: 30 CAPSULE | Refills: 0 | Status: SHIPPED | OUTPATIENT
Start: 2025-03-08 | End: 2025-04-07

## 2025-03-09 NOTE — PATIENT COMMUNICATION
BRIEF NOTE    Subjective/Objective:  MC message was sent asking meds be sent to [facility], but pool did not change the pharmacy and [facility]s were sent to [facility]. I will call [facility] to cancel the XR 30mg rx.     Assessment/Plan:  1. Attention deficit hyperactivity disorder (ADHD), predominantly inattentive type (Primary)  - amphetamine-dextroamphetamine XR (Adderall XR) 30 mg 24 hr capsule; Take 1 capsule (30 mg) by mouth once daily in the morning. Do not crush or chew.  Dispense: 30 capsule; Refill: 0      No problem-specific Assessment & Plan notes found for this encounter.        Jamaica Augustin DO, Rodrigue  [facility]  Office: [phone]  3/8/2025 7:29 PM

## 2025-03-11 ENCOUNTER — APPOINTMENT (OUTPATIENT)
Dept: PRIMARY CARE | Facility: CLINIC | Age: 35
End: 2025-03-11

## 2025-03-11 VITALS
RESPIRATION RATE: 14 BRPM | SYSTOLIC BLOOD PRESSURE: 122 MMHG | WEIGHT: 198.6 LBS | HEART RATE: 104 BPM | OXYGEN SATURATION: 98 % | BODY MASS INDEX: 25.49 KG/M2 | HEIGHT: 74 IN | DIASTOLIC BLOOD PRESSURE: 81 MMHG | TEMPERATURE: 98.4 F

## 2025-03-11 DIAGNOSIS — F90.2 ATTENTION DEFICIT HYPERACTIVITY DISORDER (ADHD), COMBINED TYPE: Primary | ICD-10-CM

## 2025-03-11 DIAGNOSIS — Z59.71 UNDERINSURED: ICD-10-CM

## 2025-03-11 DIAGNOSIS — F32.A ANXIETY AND DEPRESSION: ICD-10-CM

## 2025-03-11 DIAGNOSIS — F41.9 ANXIETY AND DEPRESSION: ICD-10-CM

## 2025-03-11 PROCEDURE — 80359 METHYLENEDIOXYAMPHETAMINES: CPT

## 2025-03-11 PROCEDURE — 80307 DRUG TEST PRSMV CHEM ANLYZR: CPT

## 2025-03-11 PROCEDURE — 3008F BODY MASS INDEX DOCD: CPT | Performed by: STUDENT IN AN ORGANIZED HEALTH CARE EDUCATION/TRAINING PROGRAM

## 2025-03-11 PROCEDURE — 80325 AMPHETAMINES 3OR 4: CPT

## 2025-03-11 PROCEDURE — 1036F TOBACCO NON-USER: CPT | Performed by: STUDENT IN AN ORGANIZED HEALTH CARE EDUCATION/TRAINING PROGRAM

## 2025-03-11 PROCEDURE — 99213 OFFICE O/P EST LOW 20 MIN: CPT | Performed by: STUDENT IN AN ORGANIZED HEALTH CARE EDUCATION/TRAINING PROGRAM

## 2025-03-11 ASSESSMENT — PATIENT HEALTH QUESTIONNAIRE - PHQ9
2. FEELING DOWN, DEPRESSED OR HOPELESS: NOT AT ALL
1. LITTLE INTEREST OR PLEASURE IN DOING THINGS: NOT AT ALL
SUM OF ALL RESPONSES TO PHQ9 QUESTIONS 1 AND 2: 0
SUM OF ALL RESPONSES TO PHQ QUESTIONS 1-9: 4
9. THOUGHTS THAT YOU WOULD BE BETTER OFF DEAD, OR OF HURTING YOURSELF: NOT AT ALL
4. FEELING TIRED OR HAVING LITTLE ENERGY: MORE THAN HALF THE DAYS
10. IF YOU CHECKED OFF ANY PROBLEMS, HOW DIFFICULT HAVE THESE PROBLEMS MADE IT FOR YOU TO DO YOUR WORK, TAKE CARE OF THINGS AT HOME, OR GET ALONG WITH OTHER PEOPLE: SOMEWHAT DIFFICULT
8. MOVING OR SPEAKING SO SLOWLY THAT OTHER PEOPLE COULD HAVE NOTICED. OR THE OPPOSITE, BEING SO FIGETY OR RESTLESS THAT YOU HAVE BEEN MOVING AROUND A LOT MORE THAN USUAL: NOT AT ALL
3. TROUBLE FALLING OR STAYING ASLEEP OR SLEEPING TOO MUCH: MORE THAN HALF THE DAYS
7. TROUBLE CONCENTRATING ON THINGS, SUCH AS READING THE NEWSPAPER OR WATCHING TELEVISION: NOT AT ALL
6. FEELING BAD ABOUT YOURSELF - OR THAT YOU ARE A FAILURE OR HAVE LET YOURSELF OR YOUR FAMILY DOWN: NOT AT ALL
5. POOR APPETITE OR OVEREATING: NOT AT ALL

## 2025-03-11 ASSESSMENT — ANXIETY QUESTIONNAIRES
GAD7 TOTAL SCORE: 0
5. BEING SO RESTLESS THAT IT IS HARD TO SIT STILL: NOT AT ALL
2. NOT BEING ABLE TO STOP OR CONTROL WORRYING: NOT AT ALL
1. FEELING NERVOUS, ANXIOUS, OR ON EDGE: NOT AT ALL
3. WORRYING TOO MUCH ABOUT DIFFERENT THINGS: NOT AT ALL
IF YOU CHECKED OFF ANY PROBLEMS ON THIS QUESTIONNAIRE, HOW DIFFICULT HAVE THESE PROBLEMS MADE IT FOR YOU TO DO YOUR WORK, TAKE CARE OF THINGS AT HOME, OR GET ALONG WITH OTHER PEOPLE: NOT DIFFICULT AT ALL
4. TROUBLE RELAXING: NOT AT ALL
6. BECOMING EASILY ANNOYED OR IRRITABLE: NOT AT ALL
7. FEELING AFRAID AS IF SOMETHING AWFUL MIGHT HAPPEN: NOT AT ALL

## 2025-03-11 NOTE — PROGRESS NOTES
FAMILY MEDICINE  OFFICE VISIT   Corwin Sesay  [id number]  1990    PCP: [place] Augustin DO     Chief Complaint:   Chief Complaint   Patient presents with    6 month follow up     Pt presents for 6 month fu  Pt states he needs a different anti depressant      SUBJECTIVE     Corwin Sesay is a 34 y.o. English-speaking male with pertinent PMHx of ADHD, anxiety, who presents to the clinic with complaints of follow-up.    Patient is new to me as PCP, as Dr. Tirado left the practice in January.     Send all controls to [place]     ADHD  Mood  - BP: 122/81  - Tachycardia: 104 on vitals, 90s on exam  - Jitteriness: No  - Caffeine intake: no  - Appetite: good  - Weight loss: no  - Sleep: overtly tired  - Dose effective: yes  - PDMP: reviewed  - Celexa has been giving him terrible fatigue.   - First started taking in AM, all day had fatigue and brainfog.   - Moved it to night time. Then so fatigued in the AM.   - Sleeps through all of the alarms  - Always has been better to wake up on days that he takes the Adderall.   - Without 20mg IR in the afternoon he is shutting down.   - Was on Lexapro and Zoloft in the past.   - Celexa has been on the longest.   - Patient Health Questionnaire-9 Score: 4  - ELI-7 Total Score: 0 (3/11/2025  3:00 PM)  - Dad is on Celexa and doesn't have that effect. Mom on Prozac.   - Diagnosed with IBS -- was related to diet.   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   - His wife is pregnant with twins, due in April but told any day they could come.   - They are having 2 girls.   - He is very excited about the pregnancy, as they were going through infertility work-up and then found out they were pregnant when he got his results back of sperm abnormality.       The following portions of the patient's chart were reviewed in this encounter and updated as appropriate:  Tobacco  Allergies  " Meds  Problems  Med Hx  Surg Hx  Fam Hx         Home Medication List:  Current Outpatient Medications   Medication Instructions    amphetamine-dextroamphetamine (Adderall) 20 mg tablet 20 mg, oral, Daily    amphetamine-dextroamphetamine XR (Adderall XR) 30 mg 24 hr capsule 30 mg, oral, Every morning, Do not crush or chew.    citalopram (CELEXA) 20 mg, oral, Daily         OBJECTIVE   /81 (BP Location: Left arm, Patient Position: Sitting, BP Cuff Size: Large adult)   Pulse 104   Temp 36.9 °C (98.4 °F) (Temporal)   Resp 14   Ht 1.88 m (6' 2\")   Wt 90.1 kg (198 lb 9.6 oz)   SpO2 98%   BMI 25.50 kg/m²   Vital signs and pulse oximetry reviewed.     Physical Exam  Vitals and nursing note reviewed.   Constitutional:       Appearance: Normal appearance.   HENT:      Head: Normocephalic and atraumatic.      Right Ear: Tympanic membrane, ear canal and external ear normal.      Left Ear: Tympanic membrane, ear canal and external ear normal.      Nose: Nose normal. No congestion or rhinorrhea.   Eyes:      General: No scleral icterus.     Conjunctiva/sclera: Conjunctivae normal.   Cardiovascular:      Rate and Rhythm: Normal rate and regular rhythm.      Heart sounds: No murmur heard.  Pulmonary:      Effort: Pulmonary effort is normal. No respiratory distress.      Breath sounds: Normal breath sounds. No wheezing, rhonchi or rales.   Abdominal:      General: Abdomen is flat. Bowel sounds are normal.   Musculoskeletal:      Cervical back: No rigidity or tenderness.      Right lower leg: No edema.      Left lower leg: No edema.   Lymphadenopathy:      Cervical: No cervical adenopathy.   Skin:     General: Skin is warm.      Coloration: Skin is not jaundiced.   Neurological:      Mental Status: He is alert. Mental status is at baseline.   Psychiatric:         Mood and Affect: Mood is anxious.         Behavior: Behavior is hyperactive. Behavior is not agitated. Behavior is cooperative.      Comments: Calmed down " when told me about his wife and that she expecting twins.          ASSESSMENT & PLAN     Problem List Items Addressed This Visit       ADD (attention deficit disorder) - Primary    Overview     - Has taken meds for this since high school, on and off  - Prior treatment:   - Wellbutrin 150 mg, started 2/2023, no longer taking 8/2023  - Current regimen:  - Adderall XR 30 mg daily  - Adderall IR 20 mg PRN in afternoon  - CS requirements:  - UDS completed 3/11/2025  - CSA signed 3/11/2025         Current Assessment & Plan     Patient is here for ADD/ADHD follow-up. Currently regimen: Adderall XR 30mg qAM, Adderall IR 20mg qAfternoon PRN. Patient reports this dose is working well for them.  - UDS collected today.   - CSA signed: 3/11/2025.   - PDMP reviewed.   - I discussed with patient if UDS comes back as expected, their medication will be sent to the pharmacy.   - No evidence of tachycardia, jitteriness, decreased appetite, uncontrolled weight loss, issues with sleep. Having too much fatigue.   - Patient will need to return q3mo for follow-up.   - Will send remainder of 3mo supply when UDS returns.     1. Attention deficit hyperactivity disorder (ADHD), combined type (Primary)  - Drug Screen, Urine With Reflex to Confirmation  - Follow Up In [facility] - PCP - Established; Future  - Amphetamine Confirm, Urine  - amphetamine-dextroamphetamine XR (Adderall XR) 30 mg 24 hr capsule; Take 1 capsule (30 mg) by mouth once daily in the morning. Do not crush or chew. Do not fill before April 7, 2025.  Dispense: 30 capsule; Refill: 0  - amphetamine-dextroamphetamine XR (Adderall XR) 30 mg 24 hr capsule; Take 1 capsule (30 mg) by mouth once daily in the morning. Do not crush or chew. Do not fill before May 5, 2025.  Dispense: 30 capsule; Refill: 0  - amphetamine-dextroamphetamine (Adderall) 20 mg tablet; Take 1 tablet (20 mg) by mouth once daily as needed (afternoon symptoms). Do not fill before April 3, 2025.   Dispense: 30 tablet; Refill: 0  - amphetamine-dextroamphetamine (Adderall) 20 mg tablet; Take 1 tablet (20 mg) by mouth once daily as needed (afternoon symptoms). Do not fill before May 1, 2025.  Dispense: 30 tablet; Refill: 0  - amphetamine-dextroamphetamine (Adderall) 20 mg tablet; Take 1 tablet (20 mg) by mouth once daily as needed (afternoon symptoms). Do not fill before May 29, 2025.  Dispense: 30 tablet; Refill: 0  - amphetamine-dextroamphetamine XR (Adderall XR) 30 mg 24 hr capsule; Take 1 capsule (30 mg) by mouth once daily in the morning. Do not crush or chew. Do not fill before June 2, 2025.  Dispense: 30 capsule; Refill: 0           Relevant Medications    amphetamine-dextroamphetamine XR (Adderall XR) 30 mg 24 hr capsule (Start on 4/7/2025)    amphetamine-dextroamphetamine XR (Adderall XR) 30 mg 24 hr capsule (Start on 5/5/2025)    amphetamine-dextroamphetamine (Adderall) 20 mg tablet (Start on 4/3/2025)    amphetamine-dextroamphetamine (Adderall) 20 mg tablet (Start on 5/1/2025)    amphetamine-dextroamphetamine (Adderall) 20 mg tablet (Start on 5/29/2025)    amphetamine-dextroamphetamine XR (Adderall XR) 30 mg 24 hr capsule (Start on 6/2/2025)    Other Relevant Orders    Drug Screen, Urine With Reflex to Confirmation (Completed)    Follow Up In [facility] - PCP - Established    Amphetamine Confirm, Urine    Anxiety and depression    Overview     - Mom and dad both with anx/depression. Mom on Prozac. Dad on Celexa.   - Prior treatment:   - Propranolol 40 mg PRN, just stopped.  - BuSpar, caused insomnia   - Lexapro, unable to tolerate due to decreased libido.  - Wellbutrin, prescribed 2/2023, not taking per 8/2023 visit.  - Current regimen:  - Celexa 20 mg daily         Current Assessment & Plan     Patient was very anxious today on exam, but this was my first time seeing the patient, as PCP just left the office. With further conversation, patient did calm down and was not nearly as anxious at  time of leaving appt. SCALES with PHQ9 4, ELI 0. He has been struggling with sleep recently. Feels like he is constantly tired from the Celexa. Even takes the Adderall IR at same time and that does not keep him up at night.   - He has tried morning dose vs night dose of Celexa. Currently taking at night. At this time advise taking at 12pm to see if this helps cut down on the overt fatigue in the AM when he is attempting to wake up for work.   - If this does not improve, consider repeat labwork vs switching to Prozac.   - Patient had labs done at [facility] during his infertility work-up recently within the last year, so we will attempt to obtain these.   - Patient really likes the Celexa for his mood and feels like it has been the best fit thus far for his anx and depression, so we will only switch to Prozac if we can't get fatigue under control.   - SCALES next appt.   - 3mo follow-up as his wife is having twins this month. I explained he can reach out to us, if he needs us prior to the June appt.          Underinsured    Current Assessment & Plan     Patient self pay. Does not have insurance through job.   - Provided him local agency number today.   - Discussed the [place] [employer].   - Consider / referral.             [id number] for SELF PAY    Follow-Up Recommendations: 3mo SCALES    Please excuse any typos or grammatical errors, part of this note was constructed with Dragon dictation software.    Jamaica Augustin DO, [place]  [facility]   Office: [phone]  3/12/2025 11:12 PM

## 2025-03-12 PROBLEM — F32.A ANXIETY AND DEPRESSION: Status: ACTIVE | Noted: 2023-02-24

## 2025-03-12 PROBLEM — Z59.71 UNDERINSURED: Status: ACTIVE | Noted: 2025-03-12

## 2025-03-12 RX ORDER — DEXTROAMPHETAMINE SACCHARATE, AMPHETAMINE ASPARTATE MONOHYDRATE, DEXTROAMPHETAMINE SULFATE AND AMPHETAMINE SULFATE 7.5; 7.5; 7.5; 7.5 MG/1; MG/1; MG/1; MG/1
30 CAPSULE, EXTENDED RELEASE ORAL EVERY MORNING
Qty: 30 CAPSULE | Refills: 0 | Status: SHIPPED | OUTPATIENT
Start: 2025-04-07 | End: 2025-05-07

## 2025-03-12 RX ORDER — DEXTROAMPHETAMINE SACCHARATE, AMPHETAMINE ASPARTATE, DEXTROAMPHETAMINE SULFATE AND AMPHETAMINE SULFATE 5; 5; 5; 5 MG/1; MG/1; MG/1; MG/1
20 TABLET ORAL DAILY PRN
Qty: 30 TABLET | Refills: 0 | Status: SHIPPED | OUTPATIENT
Start: 2025-05-29 | End: 2025-06-28

## 2025-03-12 RX ORDER — DEXTROAMPHETAMINE SACCHARATE, AMPHETAMINE ASPARTATE MONOHYDRATE, DEXTROAMPHETAMINE SULFATE AND AMPHETAMINE SULFATE 7.5; 7.5; 7.5; 7.5 MG/1; MG/1; MG/1; MG/1
30 CAPSULE, EXTENDED RELEASE ORAL EVERY MORNING
Qty: 30 CAPSULE | Refills: 0 | Status: SHIPPED | OUTPATIENT
Start: 2025-05-05 | End: 2025-06-04

## 2025-03-12 RX ORDER — DEXTROAMPHETAMINE SACCHARATE, AMPHETAMINE ASPARTATE, DEXTROAMPHETAMINE SULFATE AND AMPHETAMINE SULFATE 5; 5; 5; 5 MG/1; MG/1; MG/1; MG/1
20 TABLET ORAL DAILY PRN
Qty: 30 TABLET | Refills: 0 | Status: SHIPPED | OUTPATIENT
Start: 2025-05-01 | End: 2025-05-31

## 2025-03-12 RX ORDER — DEXTROAMPHETAMINE SACCHARATE, AMPHETAMINE ASPARTATE MONOHYDRATE, DEXTROAMPHETAMINE SULFATE AND AMPHETAMINE SULFATE 7.5; 7.5; 7.5; 7.5 MG/1; MG/1; MG/1; MG/1
30 CAPSULE, EXTENDED RELEASE ORAL EVERY MORNING
Qty: 30 CAPSULE | Refills: 0 | Status: SHIPPED | OUTPATIENT
Start: 2025-06-02 | End: 2025-07-02

## 2025-03-12 RX ORDER — DEXTROAMPHETAMINE SACCHARATE, AMPHETAMINE ASPARTATE, DEXTROAMPHETAMINE SULFATE AND AMPHETAMINE SULFATE 5; 5; 5; 5 MG/1; MG/1; MG/1; MG/1
20 TABLET ORAL DAILY PRN
Qty: 30 TABLET | Refills: 0 | Status: SHIPPED | OUTPATIENT
Start: 2025-04-03 | End: 2025-05-03

## 2025-03-13 NOTE — ASSESSMENT & PLAN NOTE
Patient is here for ADD/ADHD follow-up. Currently regimen: Adderall XR 30mg qAM, Adderall IR 20mg qAfternoon PRN. Patient reports this dose is working well for them.  - UDS collected today.   - CSA signed: 3/11/2025.   - PDMP reviewed.   - I discussed with patient if UDS comes back as expected, their medication will be sent to the pharmacy.   - No evidence of tachycardia, jitteriness, decreased appetite, uncontrolled weight loss, issues with sleep. Having too much fatigue.   - Patient will need to return q3mo for follow-up.   - Will send remainder of 3mo supply when UDS returns.     1. Attention deficit hyperactivity disorder (ADHD), combined type (Primary)  - Drug Screen, Urine With Reflex to Confirmation  - Follow Up In [facility] - PCP - Established; Future  - Amphetamine Confirm, Urine  - amphetamine-dextroamphetamine XR (Adderall XR) 30 mg 24 hr capsule; Take 1 capsule (30 mg) by mouth once daily in the morning. Do not crush or chew. Do not fill before April 7, 2025.  Dispense: 30 capsule; Refill: 0  - amphetamine-dextroamphetamine XR (Adderall XR) 30 mg 24 hr capsule; Take 1 capsule (30 mg) by mouth once daily in the morning. Do not crush or chew. Do not fill before May 5, 2025.  Dispense: 30 capsule; Refill: 0  - amphetamine-dextroamphetamine (Adderall) 20 mg tablet; Take 1 tablet (20 mg) by mouth once daily as needed (afternoon symptoms). Do not fill before April 3, 2025.  Dispense: 30 tablet; Refill: 0  - amphetamine-dextroamphetamine (Adderall) 20 mg tablet; Take 1 tablet (20 mg) by mouth once daily as needed (afternoon symptoms). Do not fill before May 1, 2025.  Dispense: 30 tablet; Refill: 0  - amphetamine-dextroamphetamine (Adderall) 20 mg tablet; Take 1 tablet (20 mg) by mouth once daily as needed (afternoon symptoms). Do not fill before May 29, 2025.  Dispense: 30 tablet; Refill: 0  - amphetamine-dextroamphetamine XR (Adderall XR) 30 mg 24 hr capsule; Take 1 capsule (30 mg) by mouth once  daily in the morning. Do not crush or chew. Do not fill before June 2, 2025.  Dispense: 30 capsule; Refill: 0

## 2025-03-13 NOTE — ASSESSMENT & PLAN NOTE
Patient self pay. Does not have insurance through job.   - Provided him local agency number today.   - Discussed the [place] [employer].   - Consider SW/CM referral.

## 2025-03-13 NOTE — ASSESSMENT & PLAN NOTE
Patient was very anxious today on exam, but this was my first time seeing the patient, as PCP just left the office. With further conversation, patient did calm down and was not nearly as anxious at time of leaving appt. SCALES with PHQ9 4, ELI 0. He has been struggling with sleep recently. Feels like he is constantly tired from the Celexa. Even takes the Adderall IR at same time and that does not keep him up at night.   - He has tried morning dose vs night dose of Celexa. Currently taking at night. At this time advise taking at 12pm to see if this helps cut down on the overt fatigue in the AM when he is attempting to wake up for work.   - If this does not improve, consider repeat labwork vs switching to Prozac.   - Patient had labs done at [facility] during his infertility work-up recently within the last year, so we will attempt to obtain these.   - Patient really likes the Celexa for his mood and feels like it has been the best fit thus far for his anx and depression, so we will only switch to Prozac if we can't get fatigue under control.   - SCALES next appt.   - 3mo follow-up as his wife is having twins this month. I explained he can reach out to us, if he needs us prior to the June appt.

## 2025-03-15 LAB
AMPHET UR-MCNC: 1517 NG/ML
MDA UR-MCNC: <200 NG/ML
MDEA UR-MCNC: <200 NG/ML
MDMA UR-MCNC: <200 NG/ML
METHAMPHET UR-MCNC: <200 NG/ML
PHENTERMINE UR CFM-MCNC: <200 NG/ML

## 2025-06-13 ENCOUNTER — APPOINTMENT (OUTPATIENT)
Dept: PRIMARY CARE | Facility: CLINIC | Age: 35
End: 2025-06-13

## 2025-06-13 VITALS
TEMPERATURE: 98.3 F | BODY MASS INDEX: 26.23 KG/M2 | HEIGHT: 74 IN | DIASTOLIC BLOOD PRESSURE: 80 MMHG | SYSTOLIC BLOOD PRESSURE: 131 MMHG | OXYGEN SATURATION: 98 % | HEART RATE: 102 BPM | WEIGHT: 204.4 LBS

## 2025-06-13 DIAGNOSIS — R53.83 OTHER FATIGUE: ICD-10-CM

## 2025-06-13 DIAGNOSIS — F32.A ANXIETY AND DEPRESSION: Primary | ICD-10-CM

## 2025-06-13 DIAGNOSIS — F90.2 ATTENTION DEFICIT HYPERACTIVITY DISORDER (ADHD), COMBINED TYPE: ICD-10-CM

## 2025-06-13 DIAGNOSIS — F41.9 ANXIETY AND DEPRESSION: Primary | ICD-10-CM

## 2025-06-13 PROCEDURE — 1036F TOBACCO NON-USER: CPT | Performed by: STUDENT IN AN ORGANIZED HEALTH CARE EDUCATION/TRAINING PROGRAM

## 2025-06-13 PROCEDURE — 99213 OFFICE O/P EST LOW 20 MIN: CPT | Performed by: STUDENT IN AN ORGANIZED HEALTH CARE EDUCATION/TRAINING PROGRAM

## 2025-06-13 PROCEDURE — 3008F BODY MASS INDEX DOCD: CPT | Performed by: STUDENT IN AN ORGANIZED HEALTH CARE EDUCATION/TRAINING PROGRAM

## 2025-06-13 RX ORDER — DEXTROAMPHETAMINE SACCHARATE, AMPHETAMINE ASPARTATE MONOHYDRATE, DEXTROAMPHETAMINE SULFATE AND AMPHETAMINE SULFATE 7.5; 7.5; 7.5; 7.5 MG/1; MG/1; MG/1; MG/1
30 CAPSULE, EXTENDED RELEASE ORAL 2 TIMES DAILY
Qty: 60 CAPSULE | Refills: 0 | Status: SHIPPED | OUTPATIENT
Start: 2025-07-13 | End: 2025-08-12

## 2025-06-13 RX ORDER — DEXTROAMPHETAMINE SACCHARATE, AMPHETAMINE ASPARTATE, DEXTROAMPHETAMINE SULFATE AND AMPHETAMINE SULFATE 5; 5; 5; 5 MG/1; MG/1; MG/1; MG/1
20 TABLET ORAL DAILY
Qty: 30 TABLET | Refills: 0 | Status: SHIPPED | OUTPATIENT
Start: 2025-06-30 | End: 2025-07-30

## 2025-06-13 RX ORDER — DEXTROAMPHETAMINE SACCHARATE, AMPHETAMINE ASPARTATE MONOHYDRATE, DEXTROAMPHETAMINE SULFATE AND AMPHETAMINE SULFATE 7.5; 7.5; 7.5; 7.5 MG/1; MG/1; MG/1; MG/1
30 CAPSULE, EXTENDED RELEASE ORAL 2 TIMES DAILY
Qty: 60 CAPSULE | Refills: 0 | Status: SHIPPED | OUTPATIENT
Start: 2025-06-13 | End: 2025-07-13

## 2025-06-13 RX ORDER — DEXTROAMPHETAMINE SACCHARATE, AMPHETAMINE ASPARTATE MONOHYDRATE, DEXTROAMPHETAMINE SULFATE AND AMPHETAMINE SULFATE 7.5; 7.5; 7.5; 7.5 MG/1; MG/1; MG/1; MG/1
30 CAPSULE, EXTENDED RELEASE ORAL 2 TIMES DAILY
Qty: 60 CAPSULE | Refills: 0 | Status: SHIPPED | OUTPATIENT
Start: 2025-08-12 | End: 2025-09-11

## 2025-06-13 RX ORDER — CITALOPRAM 20 MG/1
20 TABLET ORAL DAILY
Qty: 90 TABLET | Refills: 3 | Status: SHIPPED | OUTPATIENT
Start: 2025-06-13 | End: 2026-06-13

## 2025-06-13 RX ORDER — DEXTROAMPHETAMINE SACCHARATE, AMPHETAMINE ASPARTATE, DEXTROAMPHETAMINE SULFATE AND AMPHETAMINE SULFATE 5; 5; 5; 5 MG/1; MG/1; MG/1; MG/1
20 TABLET ORAL DAILY
Qty: 30 TABLET | Refills: 0 | Status: SHIPPED | OUTPATIENT
Start: 2025-07-30 | End: 2025-08-29

## 2025-06-13 RX ORDER — DEXTROAMPHETAMINE SACCHARATE, AMPHETAMINE ASPARTATE, DEXTROAMPHETAMINE SULFATE AND AMPHETAMINE SULFATE 5; 5; 5; 5 MG/1; MG/1; MG/1; MG/1
20 TABLET ORAL DAILY
Qty: 30 TABLET | Refills: 0 | Status: SHIPPED | OUTPATIENT
Start: 2025-08-29 | End: 2025-09-28

## 2025-06-13 ASSESSMENT — PATIENT HEALTH QUESTIONNAIRE - PHQ9
4. FEELING TIRED OR HAVING LITTLE ENERGY: SEVERAL DAYS
5. POOR APPETITE OR OVEREATING: SEVERAL DAYS
6. FEELING BAD ABOUT YOURSELF - OR THAT YOU ARE A FAILURE OR HAVE LET YOURSELF OR YOUR FAMILY DOWN: NOT AT ALL
1. LITTLE INTEREST OR PLEASURE IN DOING THINGS: NOT AT ALL
9. THOUGHTS THAT YOU WOULD BE BETTER OFF DEAD, OR OF HURTING YOURSELF: NOT AT ALL
2. FEELING DOWN, DEPRESSED OR HOPELESS: NOT AT ALL
7. TROUBLE CONCENTRATING ON THINGS, SUCH AS READING THE NEWSPAPER OR WATCHING TELEVISION: NOT AT ALL
8. MOVING OR SPEAKING SO SLOWLY THAT OTHER PEOPLE COULD HAVE NOTICED. OR THE OPPOSITE, BEING SO FIGETY OR RESTLESS THAT YOU HAVE BEEN MOVING AROUND A LOT MORE THAN USUAL: NOT AT ALL
SUM OF ALL RESPONSES TO PHQ9 QUESTIONS 1 & 2: 0
3. TROUBLE FALLING OR STAYING ASLEEP: SEVERAL DAYS
SUM OF ALL RESPONSES TO PHQ QUESTIONS 1-9: 3

## 2025-06-13 ASSESSMENT — ANXIETY QUESTIONNAIRES
5. BEING SO RESTLESS THAT IT IS HARD TO SIT STILL: NOT AT ALL
7. FEELING AFRAID AS IF SOMETHING AWFUL MIGHT HAPPEN: NOT AT ALL
3. WORRYING TOO MUCH ABOUT DIFFERENT THINGS: NOT AT ALL
4. TROUBLE RELAXING: NOT AT ALL
6. BECOMING EASILY ANNOYED OR IRRITABLE: NOT AT ALL
IF YOU CHECKED OFF ANY PROBLEMS ON THIS QUESTIONNAIRE, HOW DIFFICULT HAVE THESE PROBLEMS MADE IT FOR YOU TO DO YOUR WORK, TAKE CARE OF THINGS AT HOME, OR GET ALONG WITH OTHER PEOPLE: NOT DIFFICULT AT ALL
GAD7 TOTAL SCORE: 0
2. NOT BEING ABLE TO STOP OR CONTROL WORRYING: NOT AT ALL
1. FEELING NERVOUS, ANXIOUS, OR ON EDGE: NOT AT ALL

## 2025-06-13 NOTE — ASSESSMENT & PLAN NOTE
Fatigue is persistent today, but has 2 new babies at home as twins were born, so 3 little girls at home.   - Consider labwork at next appt with well to work up fatigue.   - He is self pay, so will need to be conscious of the cost.

## 2025-06-13 NOTE — ASSESSMENT & PLAN NOTE
Anxiety much improved from last appt. He is doing well. PHQ9 3, ELI 0. Fatigue with the Celexa has improved with changing the dose to take at lunch time. He doesn't feel like it is so hard to wake up now with the change in timing of the medication. Effective for mood stabilization, feels like dose good.   - Continue current dose of citalopram 20mg every day.

## 2025-06-13 NOTE — ASSESSMENT & PLAN NOTE
ADD/ADHD  Persistent fatigue despite improved morning wakefulness. Increased workload contributes. Current Adderall regimen insufficient, especially in afternoon and evening. Discussed increasing Adderall XR with option for immediate release.  - Will increase Adderall XR 30mg to BID scheduled.   - Will also send the Adderall IR 20mg tablet if the 30mg XR in the afternoon is too much, he is to do 40mg of of the IR in the afternoon.   - Prescribe Adderall XR 30 mg twice daily.  - Provide immediate release Adderall 20 mg as needed.  - Instruct him to monitor response and report if ineffective.

## 2025-06-13 NOTE — PROGRESS NOTES
FAMILY MEDICINE  OFFICE VISIT   Corwin Sesay  [id number]  1990    PCP: [place] Augustin DO     Chief Complaint:   Chief Complaint   Patient presents with    Follow-up     Pt presents for 3 month follow up- pt would like to discuss possible change in his instant release adderall.     SUBJECTIVE     Corwin Sesay is a 34 y.o. English-speaking male, who presents to the clinic with complaints of follow-up.    History of Present Illness  Corwin Sesay is a 34 year old male who presents with fatigue and medication management for ADHD.    He experiences persistent fatigue despite adjustments to his medication regimen. He takes citalopram midday, which has improved his ability to wake up in the morning, but he continues to feel generally fatigued throughout the day. He describes feeling 'so tired all the time.'    He is currently on Adderall XR 30 mg in the morning and an additional 20 mg of immediate release Adderall around 3 or 4 PM. Despite this regimen, he feels 'done' by 6 or 7 PM, especially as his work has become busier with the change in seasons. His work involves long hours, often not getting home until 7 or 7:30 PM, and he continues to have responsibilities at home.    He has a history of using Adderall XR in the past, including a regimen of three 20 mg doses per day during college. He reports that the current extended release formulation lasts about eight hours, from 7 AM to around 3 or 4 PM.    No issues with sleep are reported, as he can fall asleep easily and does not have trouble staying asleep. However, his son's variable sleep schedule occasionally impacts his rest.    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 He also mentions his family life, including his young son and a supportive mother-in-law who helps with childcare.      HPI      The following portions of the patient's chart were reviewed in this encounter and  "updated as appropriate:         Home Medication List:  Current Outpatient Medications   Medication Instructions    amphetamine-dextroamphetamine (Adderall) 20 mg tablet 20 mg, oral, Daily    [START ON 6/30/2025] amphetamine-dextroamphetamine (Adderall) 20 mg tablet 20 mg, oral, Daily    [START ON 7/30/2025] amphetamine-dextroamphetamine (Adderall) 20 mg tablet 20 mg, oral, Daily    [START ON 8/29/2025] amphetamine-dextroamphetamine (Adderall) 20 mg tablet 20 mg, oral, Daily    amphetamine-dextroamphetamine XR (Adderall XR) 30 mg 24 hr capsule 30 mg, oral, 2 times daily, Do not crush or chew.    [START ON 7/13/2025] amphetamine-dextroamphetamine XR (Adderall XR) 30 mg 24 hr capsule 30 mg, oral, 2 times daily, Do not crush or chew.    [START ON 8/12/2025] amphetamine-dextroamphetamine XR (Adderall XR) 30 mg 24 hr capsule 30 mg, oral, 2 times daily, Do not crush or chew.    citalopram (CELEXA) 20 mg, oral, Daily         OBJECTIVE   /80 (BP Location: Right arm, Patient Position: Sitting, BP Cuff Size: Adult)   Pulse 102   Temp 36.8 °C (98.3 °F) (Temporal)   Ht 1.88 m (6' 2\")   Wt 92.7 kg (204 lb 6.4 oz)   SpO2 98%   BMI 26.24 kg/m²   Vital signs and pulse oximetry reviewed.     Physical Exam  Vitals and nursing note reviewed.   Constitutional:       Appearance: Normal appearance.      Comments: Looks fatigued, but upbeat   HENT:      Head: Normocephalic and atraumatic.      Right Ear: Tympanic membrane, ear canal and external ear normal.      Left Ear: Tympanic membrane, ear canal and external ear normal.      Nose: Nose normal. No congestion or rhinorrhea.   Eyes:      General: No scleral icterus.     Conjunctiva/sclera: Conjunctivae normal.   Cardiovascular:      Rate and Rhythm: Normal rate and regular rhythm.      Heart sounds: No murmur heard.  Pulmonary:      Effort: Pulmonary effort is normal. No respiratory distress.      Breath sounds: Normal breath sounds. No wheezing, rhonchi or rales. "   Musculoskeletal:      Cervical back: No rigidity or tenderness.      Right lower leg: No edema.      Left lower leg: No edema.   Lymphadenopathy:      Cervical: No cervical adenopathy.   Skin:     General: Skin is warm.      Coloration: Skin is not jaundiced.   Neurological:      Mental Status: He is alert. Mental status is at baseline.   Psychiatric:         Mood and Affect: Mood normal. Mood is not anxious or depressed. Affect is not labile, flat or tearful.         Behavior: Behavior normal. Behavior is not agitated, aggressive or hyperactive. Behavior is cooperative.         Physical Exam      Results        ASSESSMENT & PLAN     Problem List Items Addressed This Visit       ADD (attention deficit disorder)    Overview   - Has taken meds for this since high school, on and off  - Prior treatment:   - Wellbutrin 150 mg, started 2/2023, no longer taking 8/2023  - Current regimen:  - Adderall XR 30 mg daily  - Adderall IR 20 mg PRN in afternoon  - CS requirements:  - UDS completed 3/11/2025  - CSA signed 3/11/2025         Current Assessment & Plan   ADD/ADHD  Persistent fatigue despite improved morning wakefulness. Increased workload contributes. Current Adderall regimen insufficient, especially in afternoon and evening. Discussed increasing Adderall XR with option for immediate release.  - Will increase Adderall XR 30mg to BID scheduled.   - Will also send the Adderall IR 20mg tablet if the 30mg XR in the afternoon is too much, he is to do 40mg of of the IR in the afternoon.   - Prescribe Adderall XR 30 mg twice daily.  - Provide immediate release Adderall 20 mg as needed.  - Instruct him to monitor response and report if ineffective.         Relevant Medications    amphetamine-dextroamphetamine XR (Adderall XR) 30 mg 24 hr capsule    amphetamine-dextroamphetamine XR (Adderall XR) 30 mg 24 hr capsule (Start on 7/13/2025)    amphetamine-dextroamphetamine XR (Adderall XR) 30 mg 24 hr capsule (Start on 8/12/2025)     amphetamine-dextroamphetamine (Adderall) 20 mg tablet (Start on 6/30/2025)    amphetamine-dextroamphetamine (Adderall) 20 mg tablet (Start on 7/30/2025)    amphetamine-dextroamphetamine (Adderall) 20 mg tablet (Start on 8/29/2025)    Other Relevant Orders    Follow Up In [facility] - PCP - Established    Follow Up In [facility] - PCP - Health Maintenance    Anxiety and depression - Primary    Overview   - Mom and dad both with anx/depression. Mom on Prozac. Dad on Celexa.   - Prior treatment:   - Propranolol 40 mg PRN, just stopped.  - BuSpar, caused insomnia   - Lexapro, unable to tolerate due to decreased libido.  - Wellbutrin, prescribed 2/2023, not taking per 8/2023 visit.  - Current regimen:  - Celexa 20 mg daily         Current Assessment & Plan   Anxiety much improved from last appt. He is doing well. PHQ9 3, ELI 0. Fatigue with the Celexa has improved with changing the dose to take at lunch time. He doesn't feel like it is so hard to wake up now with the change in timing of the medication. Effective for mood stabilization, feels like dose good.   - Continue current dose of citalopram 20mg every day.          Relevant Medications    citalopram (CeleXA) 20 mg tablet    Other Relevant Orders    Follow Up In [facility] - PCP - Established    Follow Up In [facility] - PCP - Health Maintenance    Other fatigue    Current Assessment & Plan   Fatigue is persistent today, but has 2 new babies at home as twins were born, so 3 little girls at home.   - Consider labwork at next appt with well to work up fatigue.   - He is self pay, so will need to be conscious of the cost.          Relevant Orders    Follow Up In [facility] - PCP - Established    Follow Up In [facility] - PCP - Health Maintenance       Assessment & Plan  General Health Maintenance  Emphasized medication safety around children.  - Advise him to take medications out of sight of  children.      Level 3    Follow-Up Recommendations: q3mo     Please excuse any typos or grammatical errors, part of this note was constructed with Dragon dictation software.    This medical note was created with the assistance of artificial intelligence (AI) for documentation purposes. The content has been reviewed and confirmed by the healthcare provider for accuracy and completeness. Patient consented to the use of audio recording and use of AI during their visit.         Jamaica Augustin DO, [place]  [facility]   Office: [phone]  6/13/2025 2:12 PM

## 2025-06-13 NOTE — PATIENT INSTRUCTIONS
VISIT SUMMARY:  Today, we discussed your ongoing fatigue and the management of your ADHD medication. We reviewed your current medication regimen and made some adjustments to better address your symptoms. We also touched on the importance of medication safety, especially around children.    YOUR PLAN:  -FATIGUE: Fatigue means feeling extremely tired and lacking energy. We discussed that your current Adderall regimen is not sufficient, especially in the afternoon and evening. We have prescribed Adderall XR 30 mg to be taken twice daily and provided immediate release Adderall 20-40 mg to be taken as needed if the 30mg is too much. Please monitor your response to this new regimen and let us know if it is not effective.    -DEPRESSION: Depression is a mood disorder that causes persistent feelings of sadness and loss of interest. Your depression is currently well-managed with citalopram, so we will continue with your current dose.    -GENERAL HEALTH MAINTENANCE: We emphasized the importance of medication safety, especially around children. Please ensure that you take your medications out of sight of children.    INSTRUCTIONS:  Please monitor your response to the new Adderall regimen and report back if it is not effective. Continue taking your current dose of citalopram. Ensure that you take your medications out of sight of children.

## 2025-09-19 ENCOUNTER — APPOINTMENT (OUTPATIENT)
Dept: PRIMARY CARE | Facility: CLINIC | Age: 35
End: 2025-09-19

## 2025-12-18 ENCOUNTER — APPOINTMENT (OUTPATIENT)
Dept: PRIMARY CARE | Facility: CLINIC | Age: 35
End: 2025-12-18